# Patient Record
Sex: FEMALE | Race: WHITE | Employment: OTHER | URBAN - METROPOLITAN AREA
[De-identification: names, ages, dates, MRNs, and addresses within clinical notes are randomized per-mention and may not be internally consistent; named-entity substitution may affect disease eponyms.]

---

## 2017-10-12 ENCOUNTER — HOSPITAL ENCOUNTER (OUTPATIENT)
Dept: MAMMOGRAPHY | Age: 72
Discharge: HOME | End: 2017-10-12
Admitting: FAMILY MEDICINE

## 2017-10-12 DIAGNOSIS — Z12.31 ENCOUNTER FOR SCREENING MAMMOGRAM FOR BREAST CANCER: ICD-10-CM

## 2018-09-04 ENCOUNTER — OFFICE VISIT (OUTPATIENT)
Dept: CARDIOLOGY CLINIC | Age: 73
End: 2018-09-04

## 2018-09-04 VITALS
HEART RATE: 74 BPM | WEIGHT: 142 LBS | SYSTOLIC BLOOD PRESSURE: 116 MMHG | DIASTOLIC BLOOD PRESSURE: 64 MMHG | BODY MASS INDEX: 26.13 KG/M2 | OXYGEN SATURATION: 96 % | HEIGHT: 62 IN

## 2018-09-04 DIAGNOSIS — E78.2 MIXED HYPERLIPIDEMIA: ICD-10-CM

## 2018-09-04 DIAGNOSIS — R06.09 DOE (DYSPNEA ON EXERTION): ICD-10-CM

## 2018-09-04 DIAGNOSIS — R07.89 OTHER CHEST PAIN: ICD-10-CM

## 2018-09-04 DIAGNOSIS — R07.9 CHEST PAIN, UNSPECIFIED TYPE: Primary | ICD-10-CM

## 2018-09-04 PROCEDURE — 4040F PNEUMOC VAC/ADMIN/RCVD: CPT | Performed by: INTERNAL MEDICINE

## 2018-09-04 PROCEDURE — 1123F ACP DISCUSS/DSCN MKR DOCD: CPT | Performed by: INTERNAL MEDICINE

## 2018-09-04 PROCEDURE — 1101F PT FALLS ASSESS-DOCD LE1/YR: CPT | Performed by: INTERNAL MEDICINE

## 2018-09-04 PROCEDURE — G8427 DOCREV CUR MEDS BY ELIG CLIN: HCPCS | Performed by: INTERNAL MEDICINE

## 2018-09-04 PROCEDURE — 1090F PRES/ABSN URINE INCON ASSESS: CPT | Performed by: INTERNAL MEDICINE

## 2018-09-04 PROCEDURE — 93000 ELECTROCARDIOGRAM COMPLETE: CPT | Performed by: INTERNAL MEDICINE

## 2018-09-04 PROCEDURE — 1036F TOBACCO NON-USER: CPT | Performed by: INTERNAL MEDICINE

## 2018-09-04 PROCEDURE — G8419 CALC BMI OUT NRM PARAM NOF/U: HCPCS | Performed by: INTERNAL MEDICINE

## 2018-09-04 PROCEDURE — 99215 OFFICE O/P EST HI 40 MIN: CPT | Performed by: INTERNAL MEDICINE

## 2018-09-04 PROCEDURE — 3017F COLORECTAL CA SCREEN DOC REV: CPT | Performed by: INTERNAL MEDICINE

## 2018-09-04 PROCEDURE — G8400 PT W/DXA NO RESULTS DOC: HCPCS | Performed by: INTERNAL MEDICINE

## 2018-09-04 RX ORDER — AMOXICILLIN 500 MG
CAPSULE ORAL
COMMUNITY

## 2018-09-04 RX ORDER — CALCIUM CARBONATE 300MG(750)
TABLET,CHEWABLE ORAL
COMMUNITY

## 2018-09-04 NOTE — PATIENT INSTRUCTIONS
Plan:  1. Echocardiogram  2. GXT myoview  3. Recommend follow up with PCP  4. Labs- Lipids, CMP  5.  Follow up dependent upon testing - We will call you with results

## 2018-09-04 NOTE — COMMUNICATION BODY
never smoked. She has never used smokeless tobacco. She reports that she does not drink alcohol or use illicit drugs. Family History:  Father  age 67   Mother had \"heart problems\" but could not be specific  Brother  age 61 from massive stroke      Home Medications:  Prior to Admission medications    Medication Sig Start Date End Date Taking? Authorizing Provider   Magnesium 400 MG TABS Take by mouth   Yes Historical Provider, MD   Omega-3 Fatty Acids (FISH OIL) 1200 MG CAPS Take by mouth   Yes Historical Provider, MD   citalopram (CELEXA) 20 MG tablet Take 20 mg by mouth daily. Yes Historical Provider, MD   Multiple Vitamins-Minerals (THERAPEUTIC MULTIVITAMIN-MINERALS) tablet Take 1 tablet by mouth daily. Yes Historical Provider, MD   Calcium Carbonate-Vitamin D (CALCIUM-VITAMIN D) 500-200 MG-UNIT per tablet Take 1 tablet by mouth daily. Yes Historical Provider, MD   Glucosamine-Chondroitin (GLUCOSAMINE CHONDR COMPLEX PO) Take  by mouth. Yes Historical Provider, MD   fexofenadine (ALLEGRA) 180 MG tablet Take 180 mg by mouth daily. Historical Provider, MD   Coenzyme Q10 (CO Q 10) 100 MG CAPS Take 200 mg by mouth daily. Historical Provider, MD        Allergies:  Sulfa antibiotics     Review of Systems:   · Constitutional: there has been no unanticipated weight loss. There's been no change in energy level, sleep pattern, or activity level. · Eyes: No visual changes or diplopia. No scleral icterus. · ENT: No Headaches, hearing loss or vertigo. No mouth sores or sore throat. · Cardiovascular: Reviewed in HPI  · Respiratory: No cough or wheezing, no sputum production. No hematemesis. · Gastrointestinal: No abdominal pain, appetite loss, blood in stools. No change in bowel or bladder habits. · Genitourinary: No dysuria, trouble voiding, or hematuria. · Musculoskeletal:  No gait disturbance, weakness or joint complaints. · Integumentary: No rash or pruritis.   · Neurological: No headache, diplopia, change in muscle strength, numbness or tingling. No change in gait, balance, coordination, mood, affect, memory, mentation, behavior. · Psychiatric: No anxiety, no depression. · Endocrine: No malaise, fatigue or temperature intolerance. No excessive thirst, fluid intake, or urination. No tremor. · Hematologic/Lymphatic: No abnormal bruising or bleeding, blood clots or swollen lymph nodes. · Allergic/Immunologic: No nasal congestion or hives. Physical Examination:    Vitals:    09/04/18 1232   BP: 116/64   Pulse: 74   SpO2: 96%        Constitutional and General Appearance: NAD   Respiratory:  · Normal excursion and expansion without use of accessory muscles  · Resp Auscultation: Normal breath sounds without dullness  Cardiovascular:  · The apical impulses not displaced  · Heart tones are crisp and normal  · Cervical veins are not engorged  · The carotid upstroke is normal in amplitude and contour without delay or bruit  · Normal S1S2, No S3, 2/6 JONO   · Peripheral pulses are symmetrical and full  · There is no clubbing, cyanosis of the extremities. · No edema  · Femoral Arteries: 2+ and equal  · Pedal Pulses: 2+ and equal   Abdomen:  · No masses or tenderness  · Liver/Spleen: No Abnormalities Noted  Neurological/Psychiatric:  · Alert and oriented in all spheres  · Moves all extremities well  · Exhibits normal gait balance and coordination  · No abnormalities of mood, affect, memory, mentation, or behavior are noted      Assessment:     1. Abnormal EKG: No arrhythmia. Possible prior septal infarct but was present on 2014 study with no change. 2. Hyperlipidemia: Took herself off lipitor years ago due to myalgias. Need to recheck labs and restart another statin if necessary. 3.  SOB and CP: She has CAD risk factors including age and untreated HLD. Also need concern for development of cardiomyopathy and CHF symptoms. Concern for ischemia vs CHF vs combination or non-cardiac cause.

## 2018-09-04 NOTE — PROGRESS NOTES
headache, diplopia, change in muscle strength, numbness or tingling. No change in gait, balance, coordination, mood, affect, memory, mentation, behavior. · Psychiatric: No anxiety, no depression. · Endocrine: No malaise, fatigue or temperature intolerance. No excessive thirst, fluid intake, or urination. No tremor. · Hematologic/Lymphatic: No abnormal bruising or bleeding, blood clots or swollen lymph nodes. · Allergic/Immunologic: No nasal congestion or hives. Physical Examination:    Vitals:    09/04/18 1232   BP: 116/64   Pulse: 74   SpO2: 96%        Constitutional and General Appearance: NAD   Respiratory:  · Normal excursion and expansion without use of accessory muscles  · Resp Auscultation: Normal breath sounds without dullness  Cardiovascular:  · The apical impulses not displaced  · Heart tones are crisp and normal  · Cervical veins are not engorged  · The carotid upstroke is normal in amplitude and contour without delay or bruit  · Normal S1S2, No S3, 2/6 JONO   · Peripheral pulses are symmetrical and full  · There is no clubbing, cyanosis of the extremities. · No edema  · Femoral Arteries: 2+ and equal  · Pedal Pulses: 2+ and equal   Abdomen:  · No masses or tenderness  · Liver/Spleen: No Abnormalities Noted  Neurological/Psychiatric:  · Alert and oriented in all spheres  · Moves all extremities well  · Exhibits normal gait balance and coordination  · No abnormalities of mood, affect, memory, mentation, or behavior are noted      Assessment:     1. Abnormal EKG: No arrhythmia. Possible prior septal infarct but was present on 2014 study with no change. 2. Hyperlipidemia: Took herself off lipitor years ago due to myalgias. Need to recheck labs and restart another statin if necessary. 3.  SOB and CP: She has CAD risk factors including age and untreated HLD. Also need concern for development of cardiomyopathy and CHF symptoms. Concern for ischemia vs CHF vs combination or non-cardiac cause.

## 2018-09-11 ENCOUNTER — HOSPITAL ENCOUNTER (OUTPATIENT)
Age: 73
Discharge: HOME | End: 2018-09-11
Payer: MEDICARE

## 2018-09-11 DIAGNOSIS — R06.09 DOE (DYSPNEA ON EXERTION): ICD-10-CM

## 2018-09-11 DIAGNOSIS — R07.89 OTHER CHEST PAIN: Primary | ICD-10-CM

## 2018-09-11 DIAGNOSIS — E78.2 MIXED HYPERLIPIDEMIA: ICD-10-CM

## 2018-09-11 LAB
A/G RATIO: 1.3 (ref 1.1–2.2)
ALBUMIN SERPL-MCNC: 4.4 G/DL (ref 3.4–5)
ALP BLD-CCNC: 82 U/L (ref 40–129)
ALT SERPL-CCNC: 16 U/L (ref 10–40)
ANION GAP SERPL CALCULATED.3IONS-SCNC: 9 MMOL/L (ref 3–16)
AST SERPL-CCNC: 20 U/L (ref 15–37)
BILIRUB SERPL-MCNC: 0.3 MG/DL (ref 0–1)
BUN BLDV-MCNC: 11 MG/DL (ref 7–20)
CALCIUM SERPL-MCNC: 9.5 MG/DL (ref 8.3–10.6)
CHLORIDE BLD-SCNC: 99 MMOL/L (ref 99–110)
CHOLESTEROL, TOTAL: 216 MG/DL (ref 0–199)
CO2: 28 MMOL/L (ref 21–32)
CREAT SERPL-MCNC: <0.5 MG/DL (ref 0.6–1.2)
GFR AFRICAN AMERICAN: >60
GFR NON-AFRICAN AMERICAN: >60
GLOBULIN: 3.4 G/DL
GLUCOSE BLD-MCNC: 110 MG/DL (ref 70–99)
HDLC SERPL-MCNC: 48 MG/DL (ref 40–60)
LDL CHOLESTEROL CALCULATED: 137 MG/DL
POTASSIUM SERPL-SCNC: 4.3 MMOL/L (ref 3.5–5.1)
SODIUM BLD-SCNC: 136 MMOL/L (ref 136–145)
TOTAL PROTEIN: 7.8 G/DL (ref 6.4–8.2)
TRIGL SERPL-MCNC: 153 MG/DL (ref 0–150)
VLDLC SERPL CALC-MCNC: 31 MG/DL

## 2018-09-11 PROCEDURE — 36415 COLL VENOUS BLD VENIPUNCTURE: CPT

## 2018-09-11 PROCEDURE — 80061 LIPID PANEL: CPT

## 2018-09-11 PROCEDURE — 80053 COMPREHEN METABOLIC PANEL: CPT

## 2018-09-12 ENCOUNTER — TELEPHONE (OUTPATIENT)
Dept: CARDIOLOGY CLINIC | Age: 73
End: 2018-09-12

## 2018-09-17 ENCOUNTER — HOSPITAL ENCOUNTER (OUTPATIENT)
Dept: NON INVASIVE DIAGNOSTICS | Age: 73
Discharge: HOME | End: 2018-09-17
Payer: MEDICARE

## 2018-09-17 ENCOUNTER — HOSPITAL ENCOUNTER (OUTPATIENT)
Dept: NUCLEAR MEDICINE | Age: 73
Discharge: HOME | End: 2018-09-17
Payer: MEDICARE

## 2018-09-17 DIAGNOSIS — R06.09 DOE (DYSPNEA ON EXERTION): ICD-10-CM

## 2018-09-17 DIAGNOSIS — R07.9 CHEST PAIN, UNSPECIFIED TYPE: ICD-10-CM

## 2018-09-17 LAB
LV EF: 67 %
LVEF MODALITY: NORMAL

## 2018-09-17 PROCEDURE — 93017 CV STRESS TEST TRACING ONLY: CPT

## 2018-09-17 PROCEDURE — A9502 TC99M TETROFOSMIN: HCPCS | Performed by: INTERNAL MEDICINE

## 2018-09-17 PROCEDURE — 78452 HT MUSCLE IMAGE SPECT MULT: CPT

## 2018-09-17 PROCEDURE — 3430000000 HC RX DIAGNOSTIC RADIOPHARMACEUTICAL: Performed by: INTERNAL MEDICINE

## 2018-09-17 RX ADMIN — TETROFOSMIN 11 MILLICURIE: 0.23 INJECTION, POWDER, LYOPHILIZED, FOR SOLUTION INTRAVENOUS at 08:45

## 2018-09-17 ASSESSMENT — PAIN - FUNCTIONAL ASSESSMENT: PAIN_FUNCTIONAL_ASSESSMENT: 0-10

## 2018-09-17 NOTE — PROGRESS NOTES
Pt completed stress portion of cardiac stress test. Pt is discharged to nuclear department for final scan. Nuclear tech will remove PIV. Discharge instructions given to pt. Pt verbalizes understanding to discharge instructions.

## 2018-09-18 ENCOUNTER — TELEPHONE (OUTPATIENT)
Dept: CARDIOLOGY CLINIC | Age: 73
End: 2018-09-18

## 2018-09-18 NOTE — TELEPHONE ENCOUNTER
Stress/Rest NM Myocardial SPECT RX   Order: [id number]   Status:  Final result   Visible to patient:  No (Not Released)   Notes recorded by Cristine Webster RN on 9/17/2018 at 3:51 PM EDT  Attempted calling patient no answer unable to leave message mailbox full will need to try Cathy Huang RN      ------    Notes recorded by Elif Chapin MD on 9/17/2018 at 2:46 PM EDT  Call  Stress test looks ok  No evidence sig blockage     Notified of results

## 2018-12-10 ENCOUNTER — HOSPITAL ENCOUNTER (OUTPATIENT)
Dept: MAMMOGRAPHY | Age: 73
Discharge: HOME | End: 2018-12-10
Payer: MEDICARE

## 2018-12-10 DIAGNOSIS — Z12.31 SCREENING MAMMOGRAM, ENCOUNTER FOR: ICD-10-CM

## 2018-12-10 PROCEDURE — 77063 BREAST TOMOSYNTHESIS BI: CPT

## 2020-02-19 ENCOUNTER — HOSPITAL ENCOUNTER (OUTPATIENT)
Dept: MAMMOGRAPHY | Age: 75
Discharge: HOME | End: 2020-02-19
Payer: MEDICARE

## 2020-02-19 PROCEDURE — 77063 BREAST TOMOSYNTHESIS BI: CPT

## 2021-02-08 ENCOUNTER — HOSPITAL ENCOUNTER (OUTPATIENT)
Dept: MAMMOGRAPHY | Age: 76
Discharge: HOME | End: 2021-02-08
Payer: MEDICARE

## 2021-02-08 DIAGNOSIS — Z12.31 BREAST CANCER SCREENING BY MAMMOGRAM: ICD-10-CM

## 2021-02-08 PROCEDURE — 77063 BREAST TOMOSYNTHESIS BI: CPT

## 2021-10-29 ENCOUNTER — HOSPITAL ENCOUNTER (OUTPATIENT)
Dept: GENERAL RADIOLOGY | Age: 76
Discharge: HOME | End: 2021-10-29
Payer: MEDICARE

## 2021-10-29 DIAGNOSIS — Z78.0 POST-MENOPAUSAL: ICD-10-CM

## 2021-10-29 PROCEDURE — 77080 DXA BONE DENSITY AXIAL: CPT

## 2022-04-24 ENCOUNTER — HOSPITAL ENCOUNTER (EMERGENCY)
Age: 77
Discharge: ANOTHER HOSPITAL | End: 2022-04-25
Attending: EMERGENCY MEDICINE
Payer: MEDICARE

## 2022-04-24 ENCOUNTER — APPOINTMENT (OUTPATIENT)
Dept: GENERAL RADIOLOGY | Age: 77
End: 2022-04-24
Payer: MEDICARE

## 2022-04-24 DIAGNOSIS — R07.9 CHEST PAIN, UNSPECIFIED TYPE: Primary | ICD-10-CM

## 2022-04-24 LAB
ANION GAP SERPL CALCULATED.3IONS-SCNC: 11 MMOL/L (ref 3–16)
BASOPHILS ABSOLUTE: 0 K/UL (ref 0–0.2)
BASOPHILS RELATIVE PERCENT: 0.3 %
BUN BLDV-MCNC: 17 MG/DL (ref 7–20)
CALCIUM SERPL-MCNC: 10.1 MG/DL (ref 8.3–10.6)
CHLORIDE BLD-SCNC: 98 MMOL/L (ref 99–110)
CO2: 25 MMOL/L (ref 21–32)
CREAT SERPL-MCNC: 0.6 MG/DL (ref 0.6–1.2)
EOSINOPHILS ABSOLUTE: 0.1 K/UL (ref 0–0.6)
EOSINOPHILS RELATIVE PERCENT: 1.2 %
GFR AFRICAN AMERICAN: >60
GFR NON-AFRICAN AMERICAN: >60
GLUCOSE BLD-MCNC: 113 MG/DL (ref 70–99)
HCT VFR BLD CALC: 39.2 % (ref 36–48)
HEMOGLOBIN: 13.1 G/DL (ref 12–16)
LYMPHOCYTES ABSOLUTE: 1.4 K/UL (ref 1–5.1)
LYMPHOCYTES RELATIVE PERCENT: 11.3 %
MCH RBC QN AUTO: 28.3 PG (ref 26–34)
MCHC RBC AUTO-ENTMCNC: 33.3 G/DL (ref 31–36)
MCV RBC AUTO: 84.9 FL (ref 80–100)
MONOCYTES ABSOLUTE: 0.4 K/UL (ref 0–1.3)
MONOCYTES RELATIVE PERCENT: 3 %
NEUTROPHILS ABSOLUTE: 10.2 K/UL (ref 1.7–7.7)
NEUTROPHILS RELATIVE PERCENT: 84.2 %
PDW BLD-RTO: 13.5 % (ref 12.4–15.4)
PLATELET # BLD: 384 K/UL (ref 135–450)
PMV BLD AUTO: 7.5 FL (ref 5–10.5)
POTASSIUM SERPL-SCNC: 5.4 MMOL/L (ref 3.5–5.1)
RAPID INFLUENZA  B AGN: NEGATIVE
RAPID INFLUENZA A AGN: NEGATIVE
RBC # BLD: 4.61 M/UL (ref 4–5.2)
SARS-COV-2, NAAT: NOT DETECTED
SODIUM BLD-SCNC: 134 MMOL/L (ref 136–145)
TROPONIN: <0.01 NG/ML
WBC # BLD: 12.1 K/UL (ref 4–11)

## 2022-04-24 PROCEDURE — 71045 X-RAY EXAM CHEST 1 VIEW: CPT

## 2022-04-24 PROCEDURE — 96375 TX/PRO/DX INJ NEW DRUG ADDON: CPT

## 2022-04-24 PROCEDURE — 84484 ASSAY OF TROPONIN QUANT: CPT

## 2022-04-24 PROCEDURE — 85025 COMPLETE CBC W/AUTO DIFF WBC: CPT

## 2022-04-24 PROCEDURE — 87635 SARS-COV-2 COVID-19 AMP PRB: CPT

## 2022-04-24 PROCEDURE — 36415 COLL VENOUS BLD VENIPUNCTURE: CPT

## 2022-04-24 PROCEDURE — 99285 EMERGENCY DEPT VISIT HI MDM: CPT

## 2022-04-24 PROCEDURE — 80048 BASIC METABOLIC PNL TOTAL CA: CPT

## 2022-04-24 PROCEDURE — 87804 INFLUENZA ASSAY W/OPTIC: CPT

## 2022-04-24 PROCEDURE — 6370000000 HC RX 637 (ALT 250 FOR IP): Performed by: EMERGENCY MEDICINE

## 2022-04-24 PROCEDURE — 96374 THER/PROPH/DIAG INJ IV PUSH: CPT

## 2022-04-24 PROCEDURE — 93005 ELECTROCARDIOGRAM TRACING: CPT | Performed by: EMERGENCY MEDICINE

## 2022-04-24 PROCEDURE — 6360000002 HC RX W HCPCS: Performed by: EMERGENCY MEDICINE

## 2022-04-24 RX ORDER — LISINOPRIL 10 MG/1
TABLET ORAL
COMMUNITY
Start: 2022-03-24

## 2022-04-24 RX ORDER — ONDANSETRON 4 MG/1
4 TABLET, ORALLY DISINTEGRATING ORAL ONCE
Status: DISCONTINUED | OUTPATIENT
Start: 2022-04-24 | End: 2022-04-24

## 2022-04-24 RX ORDER — ACETAMINOPHEN 325 MG/1
650 TABLET ORAL ONCE
Status: COMPLETED | OUTPATIENT
Start: 2022-04-24 | End: 2022-04-24

## 2022-04-24 RX ORDER — PROCHLORPERAZINE EDISYLATE 5 MG/ML
10 INJECTION INTRAMUSCULAR; INTRAVENOUS ONCE
Status: COMPLETED | OUTPATIENT
Start: 2022-04-24 | End: 2022-04-24

## 2022-04-24 RX ORDER — ASPIRIN 81 MG/1
162 TABLET, CHEWABLE ORAL ONCE
Status: COMPLETED | OUTPATIENT
Start: 2022-04-24 | End: 2022-04-24

## 2022-04-24 RX ORDER — ONDANSETRON 2 MG/ML
4 INJECTION INTRAMUSCULAR; INTRAVENOUS ONCE
Status: COMPLETED | OUTPATIENT
Start: 2022-04-24 | End: 2022-04-24

## 2022-04-24 RX ORDER — ATORVASTATIN CALCIUM 20 MG/1
TABLET, FILM COATED ORAL
COMMUNITY
Start: 2019-11-13

## 2022-04-24 RX ADMIN — ACETAMINOPHEN 650 MG: 325 TABLET ORAL at 22:18

## 2022-04-24 RX ADMIN — ONDANSETRON HYDROCHLORIDE 4 MG: 2 INJECTION, SOLUTION INTRAMUSCULAR; INTRAVENOUS at 21:53

## 2022-04-24 RX ADMIN — ASPIRIN 81 MG 162 MG: 81 TABLET ORAL at 22:18

## 2022-04-24 RX ADMIN — LIDOCAINE HYDROCHLORIDE: 20 SOLUTION ORAL; TOPICAL at 22:18

## 2022-04-24 RX ADMIN — PROCHLORPERAZINE EDISYLATE 10 MG: 5 INJECTION INTRAMUSCULAR; INTRAVENOUS at 22:44

## 2022-04-24 ASSESSMENT — PAIN DESCRIPTION - PAIN TYPE: TYPE: ACUTE PAIN

## 2022-04-24 ASSESSMENT — PAIN DESCRIPTION - LOCATION: LOCATION: BACK;CHEST;NECK

## 2022-04-24 ASSESSMENT — ENCOUNTER SYMPTOMS
NAUSEA: 1
ABDOMINAL PAIN: 0
TROUBLE SWALLOWING: 0
VOICE CHANGE: 0
VOMITING: 0
SHORTNESS OF BREATH: 0
BACK PAIN: 1
WHEEZING: 0
FACIAL SWELLING: 0
COLOR CHANGE: 0
STRIDOR: 0

## 2022-04-24 ASSESSMENT — PAIN - FUNCTIONAL ASSESSMENT: PAIN_FUNCTIONAL_ASSESSMENT: 0-10

## 2022-04-24 ASSESSMENT — PAIN DESCRIPTION - DESCRIPTORS: DESCRIPTORS: SHARP

## 2022-04-24 ASSESSMENT — PAIN SCALES - GENERAL: PAINLEVEL_OUTOF10: 7

## 2022-04-25 ENCOUNTER — APPOINTMENT (OUTPATIENT)
Dept: GENERAL RADIOLOGY | Age: 77
DRG: 314 | End: 2022-04-25
Attending: INTERNAL MEDICINE
Payer: MEDICARE

## 2022-04-25 ENCOUNTER — HOSPITAL ENCOUNTER (INPATIENT)
Age: 77
LOS: 1 days | Discharge: HOME | DRG: 314 | End: 2022-04-27
Attending: INTERNAL MEDICINE | Admitting: INTERNAL MEDICINE
Payer: MEDICARE

## 2022-04-25 ENCOUNTER — APPOINTMENT (OUTPATIENT)
Dept: CT IMAGING | Age: 77
DRG: 314 | End: 2022-04-25
Attending: INTERNAL MEDICINE
Payer: MEDICARE

## 2022-04-25 VITALS
SYSTOLIC BLOOD PRESSURE: 99 MMHG | BODY MASS INDEX: 27.05 KG/M2 | DIASTOLIC BLOOD PRESSURE: 50 MMHG | HEART RATE: 79 BPM | HEIGHT: 62 IN | OXYGEN SATURATION: 90 % | WEIGHT: 147 LBS | TEMPERATURE: 98.5 F | RESPIRATION RATE: 20 BRPM

## 2022-04-25 PROBLEM — R07.9 CHEST PAIN: Status: ACTIVE | Noted: 2022-04-25

## 2022-04-25 LAB
ALBUMIN SERPL-MCNC: 4.4 G/DL (ref 3.4–5)
ALP BLD-CCNC: 76 U/L (ref 40–129)
ALT SERPL-CCNC: 18 U/L (ref 10–40)
AST SERPL-CCNC: 23 U/L (ref 15–37)
BILIRUB SERPL-MCNC: 0.4 MG/DL (ref 0–1)
BILIRUBIN DIRECT: <0.2 MG/DL (ref 0–0.3)
BILIRUBIN, INDIRECT: NORMAL MG/DL (ref 0–1)
D DIMER: 600 NG/ML DDU (ref 0–229)
EKG ATRIAL RATE: 92 BPM
EKG DIAGNOSIS: NORMAL
EKG P AXIS: 47 DEGREES
EKG P-R INTERVAL: 146 MS
EKG Q-T INTERVAL: 356 MS
EKG QRS DURATION: 74 MS
EKG QTC CALCULATION (BAZETT): 440 MS
EKG R AXIS: 16 DEGREES
EKG T AXIS: 59 DEGREES
EKG VENTRICULAR RATE: 92 BPM
LIPASE: 42 U/L (ref 13–60)
TOTAL PROTEIN: 7.4 G/DL (ref 6.4–8.2)
TROPONIN: <0.01 NG/ML
TROPONIN: <0.01 NG/ML

## 2022-04-25 PROCEDURE — 6360000004 HC RX CONTRAST MEDICATION: Performed by: HOSPITALIST

## 2022-04-25 PROCEDURE — 83690 ASSAY OF LIPASE: CPT

## 2022-04-25 PROCEDURE — 36415 COLL VENOUS BLD VENIPUNCTURE: CPT

## 2022-04-25 PROCEDURE — 2580000003 HC RX 258: Performed by: HOSPITALIST

## 2022-04-25 PROCEDURE — 71046 X-RAY EXAM CHEST 2 VIEWS: CPT

## 2022-04-25 PROCEDURE — 80076 HEPATIC FUNCTION PANEL: CPT

## 2022-04-25 PROCEDURE — 96365 THER/PROPH/DIAG IV INF INIT: CPT

## 2022-04-25 PROCEDURE — 96366 THER/PROPH/DIAG IV INF ADDON: CPT

## 2022-04-25 PROCEDURE — G0378 HOSPITAL OBSERVATION PER HR: HCPCS

## 2022-04-25 PROCEDURE — 93010 ELECTROCARDIOGRAM REPORT: CPT | Performed by: INTERNAL MEDICINE

## 2022-04-25 PROCEDURE — 84484 ASSAY OF TROPONIN QUANT: CPT

## 2022-04-25 PROCEDURE — 6360000002 HC RX W HCPCS: Performed by: HOSPITALIST

## 2022-04-25 PROCEDURE — 85379 FIBRIN DEGRADATION QUANT: CPT

## 2022-04-25 PROCEDURE — 96372 THER/PROPH/DIAG INJ SC/IM: CPT

## 2022-04-25 PROCEDURE — 71260 CT THORAX DX C+: CPT

## 2022-04-25 PROCEDURE — G0379 DIRECT REFER HOSPITAL OBSERV: HCPCS

## 2022-04-25 PROCEDURE — 6370000000 HC RX 637 (ALT 250 FOR IP): Performed by: HOSPITALIST

## 2022-04-25 RX ORDER — ATORVASTATIN CALCIUM 40 MG/1
40 TABLET, FILM COATED ORAL NIGHTLY
Status: DISCONTINUED | OUTPATIENT
Start: 2022-04-25 | End: 2022-04-27 | Stop reason: HOSPADM

## 2022-04-25 RX ORDER — SODIUM CHLORIDE 0.9 % (FLUSH) 0.9 %
5-40 SYRINGE (ML) INJECTION EVERY 12 HOURS SCHEDULED
Status: DISCONTINUED | OUTPATIENT
Start: 2022-04-25 | End: 2022-04-27 | Stop reason: HOSPADM

## 2022-04-25 RX ORDER — ONDANSETRON 4 MG/1
4 TABLET, ORALLY DISINTEGRATING ORAL EVERY 8 HOURS PRN
Status: DISCONTINUED | OUTPATIENT
Start: 2022-04-25 | End: 2022-04-27 | Stop reason: HOSPADM

## 2022-04-25 RX ORDER — ASPIRIN 81 MG/1
81 TABLET, CHEWABLE ORAL DAILY
Status: DISCONTINUED | OUTPATIENT
Start: 2022-04-26 | End: 2022-04-27 | Stop reason: HOSPADM

## 2022-04-25 RX ORDER — LEVOFLOXACIN 5 MG/ML
750 INJECTION, SOLUTION INTRAVENOUS EVERY 24 HOURS
Status: DISCONTINUED | OUTPATIENT
Start: 2022-04-25 | End: 2022-04-27 | Stop reason: HOSPADM

## 2022-04-25 RX ORDER — ACETAMINOPHEN 325 MG/1
650 TABLET ORAL EVERY 6 HOURS PRN
Status: DISCONTINUED | OUTPATIENT
Start: 2022-04-25 | End: 2022-04-27 | Stop reason: HOSPADM

## 2022-04-25 RX ORDER — ONDANSETRON 2 MG/ML
4 INJECTION INTRAMUSCULAR; INTRAVENOUS EVERY 6 HOURS PRN
Status: DISCONTINUED | OUTPATIENT
Start: 2022-04-25 | End: 2022-04-27 | Stop reason: HOSPADM

## 2022-04-25 RX ORDER — SODIUM CHLORIDE 9 MG/ML
INJECTION, SOLUTION INTRAVENOUS PRN
Status: DISCONTINUED | OUTPATIENT
Start: 2022-04-25 | End: 2022-04-27 | Stop reason: HOSPADM

## 2022-04-25 RX ORDER — POLYETHYLENE GLYCOL 3350 17 G/17G
17 POWDER, FOR SOLUTION ORAL DAILY PRN
Status: DISCONTINUED | OUTPATIENT
Start: 2022-04-25 | End: 2022-04-27 | Stop reason: HOSPADM

## 2022-04-25 RX ORDER — ACETAMINOPHEN 650 MG/1
650 SUPPOSITORY RECTAL EVERY 6 HOURS PRN
Status: DISCONTINUED | OUTPATIENT
Start: 2022-04-25 | End: 2022-04-27 | Stop reason: HOSPADM

## 2022-04-25 RX ORDER — CITALOPRAM 20 MG/1
20 TABLET ORAL NIGHTLY
Status: DISCONTINUED | OUTPATIENT
Start: 2022-04-26 | End: 2022-04-27 | Stop reason: HOSPADM

## 2022-04-25 RX ORDER — ENOXAPARIN SODIUM 100 MG/ML
40 INJECTION SUBCUTANEOUS EVERY 24 HOURS
Status: DISCONTINUED | OUTPATIENT
Start: 2022-04-25 | End: 2022-04-27 | Stop reason: HOSPADM

## 2022-04-25 RX ORDER — SODIUM CHLORIDE 0.9 % (FLUSH) 0.9 %
5-40 SYRINGE (ML) INJECTION PRN
Status: DISCONTINUED | OUTPATIENT
Start: 2022-04-25 | End: 2022-04-27 | Stop reason: HOSPADM

## 2022-04-25 RX ORDER — SODIUM CHLORIDE 9 MG/ML
INJECTION, SOLUTION INTRAVENOUS CONTINUOUS
Status: DISCONTINUED | OUTPATIENT
Start: 2022-04-25 | End: 2022-04-26

## 2022-04-25 RX ORDER — CITALOPRAM 20 MG/1
20 TABLET ORAL DAILY
Status: DISCONTINUED | OUTPATIENT
Start: 2022-04-25 | End: 2022-04-25

## 2022-04-25 RX ADMIN — ENOXAPARIN SODIUM 40 MG: 100 INJECTION SUBCUTANEOUS at 12:37

## 2022-04-25 RX ADMIN — ATORVASTATIN CALCIUM 40 MG: 40 TABLET, FILM COATED ORAL at 20:23

## 2022-04-25 RX ADMIN — ACETAMINOPHEN 650 MG: 325 TABLET ORAL at 20:23

## 2022-04-25 RX ADMIN — SODIUM CHLORIDE: 9 INJECTION, SOLUTION INTRAVENOUS at 12:31

## 2022-04-25 RX ADMIN — SODIUM CHLORIDE, PRESERVATIVE FREE 10 ML: 5 INJECTION INTRAVENOUS at 12:37

## 2022-04-25 RX ADMIN — IOPAMIDOL 75 ML: 755 INJECTION, SOLUTION INTRAVENOUS at 14:10

## 2022-04-25 RX ADMIN — LEVOFLOXACIN 750 MG: 5 INJECTION, SOLUTION INTRAVENOUS at 19:04

## 2022-04-25 ASSESSMENT — PAIN SCALES - GENERAL
PAINLEVEL_OUTOF10: 0
PAINLEVEL_OUTOF10: 7

## 2022-04-25 ASSESSMENT — PAIN DESCRIPTION - LOCATION: LOCATION: HEAD

## 2022-04-25 NOTE — ED PROVIDER NOTES
[facility]  eMERGENCY dEPARTMENT eNCOUnter      Pt Name: Di Nava  MRN: [id number]  [place] 1945  Date of evaluation: 4/24/2022  Provider: Nancy Rodriguez MD    [address] [place] [address]       Chief Complaint   Patient presents with    Chest Pain     with back pain since 2pm       HISTORY OF PRESENT ILLNESS   (Location/Symptom, Timing/Onset, Context/Setting, Quality, Duration, Modifying Factors, Severity)  Note limiting factors. Di Nava is a 78 y.o. female with hx of GERD who reports left-sided chest pain radiating to her back, left jaw, and down her left arm starting at 2 PM today. Patient denies any exertion or inciting event. She denies any known aggravating factor and denies any eating around the time of the pain. Patient reports that her symptoms were moderate, constant, however did improve after approximately 5 hours after she took half of a 325 mg aspirin. Patient denies any leg swelling hemoptysis or shortness of breath. She reports her symptoms are moderate constant and gradually improving. HPI    Nursing Notes were reviewed. REVIEW OFSYSTEMS    (2-9 systems for level 4, 10 or more for level 5)     Review of Systems   Constitutional: Positive for diaphoresis. Negative for appetite change, fever and unexpected weight change. HENT: Negative for facial swelling, trouble swallowing and voice change. Eyes: Negative for visual disturbance. Respiratory: Negative for shortness of breath, wheezing and stridor. Cardiovascular: Positive for chest pain. Negative for palpitations. Gastrointestinal: Positive for nausea. Negative for abdominal pain and vomiting. Genitourinary: Negative for dysuria and vaginal bleeding. Musculoskeletal: Positive for arthralgias, back pain and myalgias. Negative for neck pain and neck stiffness. Skin: Negative for color change and wound. Neurological: Negative for seizures and syncope.    Psychiatric/Behavioral: Negative for Transportation (Medical): Not on file    Lack of Transportation (Non-Medical): Not on file   Physical Activity:     Days of Exercise per Week: Not on file    Minutes of Exercise per Session: Not on file   Stress:     Feeling of Stress : Not on file   Social Connections:     Frequency of Communication with Friends and Family: Not on file    Frequency of Social Gatherings with Friends and Family: Not on file    Attends Hoahaoism Services: Not on file    Active Member of [address] [place] [address] or Organizations: Not on file    Attends Club or Organization Meetings: Not on file    Marital Status: Not on file   Intimate Partner Violence:     Fear of Current or Ex-Partner: Not on file    Emotionally Abused: Not on file    Physically Abused: Not on file    Sexually Abused: Not on file   Housing Stability:     Unable to Pay for Housing in the Last Year: Not on file    Number of [place] in the Last Year: Not on file    Unstable Housing in the Last Year: Not on file         PHYSICAL EXAM    (up to 7 for level 4, 8 or more for level 5)     ED Triage Vitals [04/24/22 2051]   BP Temp Temp Source Pulse Resp SpO2 Height Weight   (!) 153/74 98.3 °F (36.8 °C) Oral 98 16 98 % 5' 2\" (1.575 m) 147 lb (66.7 kg)       Physical Exam  Vitals and nursing note reviewed. Constitutional:       Appearance: She is well-developed. She is not diaphoretic. HENT:      Head: Normocephalic and atraumatic. Right Ear: External ear normal.      Left Ear: External ear normal.   Eyes:      Conjunctiva/sclera: Conjunctivae normal.   Neck:      Vascular: No JVD. Trachea: No tracheal deviation. Cardiovascular:      Rate and Rhythm: Normal rate. Pulmonary:      Effort: Pulmonary effort is normal. No respiratory distress. Breath sounds: Normal breath sounds. No wheezing. Abdominal:      General: There is no distension. Palpations: Abdomen is soft. Tenderness: There is no abdominal tenderness.  There is no guarding or rebound. Musculoskeletal:         General: No tenderness or deformity. Normal range of motion. Cervical back: Neck supple. Skin:     General: Skin is warm and dry. Neurological:      Mental Status: She is alert. Cranial Nerves: No cranial nerve deficit. Comments: 5/5 strength in all 4 extremities. Normal gait. DIAGNOSTIC RESULTS     EKG:All EKG's are interpreted by the Emergency Department Physician who either signs or Co-signs this chart in the absence of a cardiologist.    The Ekg interpreted by me shows  normal sinus rhythm with a rate of 92  Axis is   Normal  QTc is  440ms  Intervals and Durations are unremarkable. ST Segments: normal  No previous EKG available for comparison      RADIOLOGY:     Interpretation per the Radiologist below, if available at the time of this note:    XR CHEST PORTABLE   Final Result   No airspace disease by radiograph. Mediastinal contours are suboptimally evaluated due to rotated projection. If there is concern for an acute mediastinal process, CT angio chest would   better evaluate. LABS:  Labs Reviewed   CBC WITH AUTO DIFFERENTIAL - Abnormal; Notable for the following components:       Result Value    WBC 12.1 (*)     Neutrophils Absolute 10.2 (*)     All other components within normal limits   BASIC METABOLIC PANEL - Abnormal; Notable for the following components:    Sodium 134 (*)     Potassium 5.4 (*)     Chloride 98 (*)     Glucose 113 (*)     All other components within normal limits   COVID-19, RAPID   RAPID INFLUENZA A/B ANTIGENS   TROPONIN       All otherlabs were within normal range or not returned as of this dictation.     EMERGENCY DEPARTMENT COURSE and DIFFERENTIAL DIAGNOSIS/MDM:   Vitals:    Vitals:    04/24/22 2051   BP: (!) 153/74   Pulse: 98   Resp: 16   Temp: 98.3 °F (36.8 °C)   TempSrc: Oral   SpO2: 98%   Weight: 147 lb (66.7 kg)   Height: 5' 2\" (1.575 m)         MDM   HEART SCORE:    History: +2 for high suspicion  EKG: +0 for normal EKG   Age: +4 for age >65 years  Risk factors (includes HLD, HTN, DM, tobacco use, obesity, and +FHx): +0 for no risk factors  Initial troponin: +0 for negative troponin    Heart score: 4. This falls under the following category: Score of 4-6, which indicates low/moderate risk for major adverse cardiac event and supports observation with repeated troponins and/or non-invasive testing      Initial EKG and troponin are unremarkable however given patient's symptoms including diaphoresis and radiation of pain in addition to her age her heart score is 4 and I feel she is appropriate for admission for further rule out. The patient is given 162 mg of additional aspirin to complete her dose as well as Tylenol and Zofran. Rapid COVID and flu tests are obtained. Patient strongly prefers to be admitted to [facility] due to patient personal preference. She is transferred to [facility] for further evaluation and care. Patient expresses understanding and agreement with this plan. CONSULTS:  IP CONSULT TO HOSPITALIST         Procedures    FINAL IMPRESSION      1. Chest pain, unspecified type          DISPOSITION/PLAN   DISPOSITION Decision To Admit 04/24/2022 10:12:11 PM           (Please note that portions of this note were completed with a voice recognition program.  Efforts were made to edit the dictations but occasionally words aremis-transcribed. )    Shelbi Reynoso MD (electronically signed)  Attending Emergency Physician           Shelbi Reynoso MD  04/24/22 2552

## 2022-04-25 NOTE — CARE COORDINATION
CASE MANAGEMENT INITIAL ASSESSMENT    Reviewed chart and completed assessment with patient at bedside  Family present:  no  Explained Case Management role/services. yes    Health Care Decision Maker :   Primary Decision Maker: Myesha Jacobs Child - [phone]    Primary Decision Maker: Alysia Snowr - Child - [phone]    Primary Decision Maker: Doroteo Cheung Child - [phone]    Secondary Decision Maker: Esa Cea - [phone]        Admit date/status: OVA 4/25/22 @ 1015  Diagnosis: chest pain     Is this a Readmission?:  No      Insurance: Medicare     Precert required for SNF: No       3 night stay required: No    Living arrangements, Adls, care needs, prior to admission: resides alone, IPTA, active     Durable Medical Equipment at home:  None    Services in the home and/or outpatient, prior to admission:none     Current PCP: Geovani Romo                 Medications: Prescription coverage? Yes Will pt require financial assistance with medications No     Transportation needs:  Pt states family can provide     PT/OT recs: [facility] Exemption Notification (HEN): na    Barriers to discharge: none    Plan/comments: pt intends dc to home without needs. Please consult CM team if needs arise.      Sharona Cisneros RN

## 2022-04-25 NOTE — ED NOTES
[facility] transfer canceled d/t change of time from 0730 to 0900. [facility] on way to facility for transfer to [facility].       Maycol Martinez RN  04/25/22 9452

## 2022-04-25 NOTE — ED NOTES
Called the access center at [phone] to have the patient transferred to [facility]. Bobbi Slater called back at [phone] and spoke with Dr Joseline Ball and accepted the patient. Waiting on bed assignment.       Rendall Canavan  68/49/36 4911

## 2022-04-25 NOTE — H&P
HOSPITAL MEDICINE     History & Physical        Patient:  Devera Gilford  YOB: 1945    MRN: [id number]     Acct: [de-identified]    PCP: Dayan Hernandez MD    Date of Admission: 4/25/2022    Date of Service:   Pt seen/examined on 4/25/2022     Placed in Observation. History obtained from reviewing the medical record and patient/family Interview. Assessment:     Devera Gilford is a 78 y.o. female who presented/brought to  on 4/25/2022  For      1. Atypical chest pain  2. Probable acute viral syndrome (sore throat, nausea, vomiting, leukocytosis) no abdominal pain, diarrhea or constipation. >  3. Mild hyperkalemia, likely secondary to dehydration poor oral intake in the setting of lisinopril use     comorbidities:    · Essential hypertension, will hold lisinopril today  · Dyslipidemia, continue statin  · Mood disorder, continue Celexa    Plan:  1. Follow troponin trend, telemetry, exercise stress test in the a.m.  2. IV hydration, supportive care repeat potassium level in the a.m.  3. .  Continue aspirin, statin  4. Check lipase, D-dimer  5. Repeat CXR  6. Continue selected home medications as ordered          Code Status: Full Code        DVT prophylaxis: [x] Lovenox                                 [] SCDs                                 [] SQ Heparin                                 [] Encourage ambulation           [] Already on Anticoagulation     Disposition:    [x] Home       [] TCU       [] Rehab       [] Psych       [] SNF       [] [place]       [] Other-    -------------------------------------------------------------  Chief Complaint: Chest discomfort      History Of Present Illness:       78 y.o. female who presented to ED with chest discomfort started yesterday, symptoms are moderate intermittent. Patient reported that she experienced neck pressure approximately a week ago, she took 2 aspirin and symptoms resolved.       Yesterday she was at a  [facility] birthday party  when she is experienced chest pressure while sitting, radiated to the left arm, no associated shortness of breath. Patient report multiple episode of none bloody emesis, and sweating, no fever. She reports sore throat which she attributed to her vomiting. Patient endorses nonproductive cough, no shortness of breath, hemoptysis or wheezing. Patient stated that one of the grandchildren was sick with a cold. No suspicious food. Patient decided to go to [facility] ED for further evaluation, upon arrival EKG obtained and showed normal sinus rhythm      Of note patient medical history significant for essential hypertension, dyslipidemia, she had negative stress test in 2018. Patient denied tobacco use. Patient stated that she is previously well without any complaint, no weight loss or other medical conditions prior to her presentation. Past Medical History:          Diagnosis Date    GERD (gastroesophageal reflux disease)        Past Surgical History:          Procedure Laterality Date    HYSTERECTOMY         Medications Prior to Admission:      Prior to Admission medications    Medication Sig Start Date End Date Taking? Authorizing Provider   lisinopril (PRINIVIL;ZESTRIL) 10 MG tablet  3/24/22   Historical Provider, MD   atorvastatin (LIPITOR) 20 MG tablet Take by mouth 11/13/19   Historical Provider, MD   Magnesium 400 MG TABS Take by mouth    Historical Provider, MD   Omega-3 Fatty Acids (FISH OIL) 1200 MG CAPS Take by mouth    Historical Provider, MD   fexofenadine (ALLEGRA) 180 MG tablet Take 180 mg by mouth daily. Historical Provider, MD   citalopram (CELEXA) 20 MG tablet Take 20 mg by mouth daily. Historical Provider, MD   Multiple Vitamins-Minerals (THERAPEUTIC MULTIVITAMIN-MINERALS) tablet Take 1 tablet by mouth daily. Historical Provider, MD   Calcium Carbonate-Vitamin D (CALCIUM-VITAMIN D) 500-200 MG-UNIT per tablet Take 1 tablet by mouth daily.     Historical Provider MD       Allergies:  Sulfa antibiotics    Social History:      The patient currently lives by herself, independent    TOBACCO:   reports that she has never smoked. She has never used smokeless tobacco.  ETOH:   reports no history of alcohol use. Family History:       Reviewed in detail . Positive as follows:       No family history on file. Diet:  ADULT DIET; Regular; caffinated product  Diet NPO    REVIEW OF SYSTEMS:   Pertinent positives as noted in the HPI. All other systems reviewed and negative. PHYSICAL EXAM:    /61   Pulse 97   Temp 98.1 °F (36.7 °C) (Oral)   Resp 18   Wt 146 lb 6.4 oz (66.4 kg)   SpO2 93%   BMI 26.78 kg/m²          Vitals:    04/25/22 1028   BP: 122/61   Pulse: 97   Resp: 18   Temp: 98.1 °F (36.7 °C)   TempSrc: Oral   SpO2: 93%   Weight: 146 lb 6.4 oz (66.4 kg)       General appearance:  No apparent distress, appears stated age and cooperative. HEENT:  Normal cephalic, atraumatic without obvious deformity. Pupils equal, round, and reactive to light. Extra ocular muscles intact. Conjunctivae/corneas clear. Neck: Supple, with full range of motion. No jugular venous distention. Trachea midline. Respiratory:  Normal respiratory effort. Clear to auscultation, bilaterally without Rales/Wheezes/Rhonchi. Cardiovascular:  Regular rate and rhythm with normal S1/S2 without murmurs, rubs or gallops. Abdomen: Soft, non-tender, non-distended with normal bowel sounds. Musculoskeletal:  No clubbing, cyanosis or edema bilaterally. Full range of motion without deformity. Skin: Skin color, texture, turgor normal.  No rashes or lesions. Neurologic:  Neurovascularly intact without any focal sensory/motor deficits.  Cranial nerves: II-XII intact, grossly non-focal.  Psychiatric:  Alert and oriented, thought content appropriate, normal insight  Capillary Refill: Brisk,< 3 seconds   Peripheral Pulses: +2 palpable, equal bilaterally       Labs:     Recent Labs     04/24/22  2030   WBC 12.1*   HGB 13.1   HCT 39.2        Recent Labs     04/24/22 2030   *   K 5.4*   CL 98*   CO2 25   BUN 17   CREATININE 0.6   CALCIUM 10.1     No results for input(s): AST, ALT, BILIDIR, BILITOT, ALKPHOS in the last 72 hours. No results for input(s): INR in the last 72 hours. Recent Labs     04/24/22 2030   TROPONINI <0.01       Urinalysis:    No results found for: [place], BACTERIA, RBCUA, BLOODU, [address], [place] [address]    Radiology:     CXR: I have reviewed the CXR with the following interpretation: No acute infiltrate, malrotation  EKG:  I have reviewed the EKG with the following interpretation: sinus no acute ischemic changes      NM Cardiac Stress Test Nuclear Imaging    (Results Pending)            Thank you Hugo Duran MD for the opportunity to be involved in this patient's care.     Electronically signed by Radha Patterson MD on 4/25/2022 at 11:30 AM

## 2022-04-25 NOTE — ED TRIAGE NOTES
Pt has generalized c/o chest pain and back pain between her shoulders intermittently x 1 week. N/V today and increased pain to posterior neck, with a headache as well.

## 2022-04-25 NOTE — ED NOTES
[facility] to [facility] regarding scheduled  time of 0730. Per [facility] dispatch, crew was there and clocked in and he would send them out, arrival time approximately 0900.       Mikayla West RN  04/25/22 3443

## 2022-04-25 NOTE — ED NOTES
Pt n/v relieved at this time, comfortable and updated regarding bed situation for admission. Provided with warm blanket and repositioned as tolerated per request. Denies further needs at this time, sister remains at bedside. Call light within reach, will continue to monitor. Marisela Lagunas RN.        You Leblanc RN  04/24/22 4500

## 2022-04-25 NOTE — ED NOTES
Bedside report given to Christie Farooq [place]. POC reviewed and care transferred at this time.       Oscar Amaro RN  04/25/22 4963

## 2022-04-26 ENCOUNTER — APPOINTMENT (OUTPATIENT)
Dept: NUCLEAR MEDICINE | Age: 77
DRG: 314 | End: 2022-04-26
Attending: INTERNAL MEDICINE
Payer: MEDICARE

## 2022-04-26 LAB
ALBUMIN SERPL-MCNC: 3.7 G/DL (ref 3.4–5)
ANION GAP SERPL CALCULATED.3IONS-SCNC: 8 MMOL/L (ref 3–16)
BUN BLDV-MCNC: 12 MG/DL (ref 7–20)
CALCIUM SERPL-MCNC: 9.6 MG/DL (ref 8.3–10.6)
CHLORIDE BLD-SCNC: 101 MMOL/L (ref 99–110)
CHOLESTEROL, TOTAL: 96 MG/DL (ref 0–199)
CO2: 25 MMOL/L (ref 21–32)
CREAT SERPL-MCNC: <0.5 MG/DL (ref 0.6–1.2)
GFR AFRICAN AMERICAN: >60
GFR NON-AFRICAN AMERICAN: >60
GLUCOSE BLD-MCNC: 97 MG/DL (ref 70–99)
HCT VFR BLD CALC: 31.9 % (ref 36–48)
HDLC SERPL-MCNC: 41 MG/DL (ref 40–60)
HEMOGLOBIN: 10.6 G/DL (ref 12–16)
LDL CHOLESTEROL CALCULATED: 42 MG/DL
LV EF: 65 %
LVEF MODALITY: NORMAL
MCH RBC QN AUTO: 28.4 PG (ref 26–34)
MCHC RBC AUTO-ENTMCNC: 33.1 G/DL (ref 31–36)
MCV RBC AUTO: 85.8 FL (ref 80–100)
PDW BLD-RTO: 13.3 % (ref 12.4–15.4)
PHOSPHORUS: 2.3 MG/DL (ref 2.5–4.9)
PLATELET # BLD: 259 K/UL (ref 135–450)
PMV BLD AUTO: 8.3 FL (ref 5–10.5)
POTASSIUM SERPL-SCNC: 4.4 MMOL/L (ref 3.5–5.1)
PRO-BNP: 285 PG/ML (ref 0–449)
PROCALCITONIN: 0.12 NG/ML (ref 0–0.15)
RBC # BLD: 3.72 M/UL (ref 4–5.2)
SODIUM BLD-SCNC: 134 MMOL/L (ref 136–145)
TRIGL SERPL-MCNC: 64 MG/DL (ref 0–150)
VLDLC SERPL CALC-MCNC: 13 MG/DL
WBC # BLD: 6.3 K/UL (ref 4–11)

## 2022-04-26 PROCEDURE — 6370000000 HC RX 637 (ALT 250 FOR IP): Performed by: NURSE PRACTITIONER

## 2022-04-26 PROCEDURE — G0378 HOSPITAL OBSERVATION PER HR: HCPCS

## 2022-04-26 PROCEDURE — 78452 HT MUSCLE IMAGE SPECT MULT: CPT

## 2022-04-26 PROCEDURE — 3430000000 HC RX DIAGNOSTIC RADIOPHARMACEUTICAL: Performed by: HOSPITALIST

## 2022-04-26 PROCEDURE — 93017 CV STRESS TEST TRACING ONLY: CPT

## 2022-04-26 PROCEDURE — 80069 RENAL FUNCTION PANEL: CPT

## 2022-04-26 PROCEDURE — 2580000003 HC RX 258: Performed by: HOSPITALIST

## 2022-04-26 PROCEDURE — 84145 PROCALCITONIN (PCT): CPT

## 2022-04-26 PROCEDURE — A9502 TC99M TETROFOSMIN: HCPCS | Performed by: HOSPITALIST

## 2022-04-26 PROCEDURE — 83880 ASSAY OF NATRIURETIC PEPTIDE: CPT

## 2022-04-26 PROCEDURE — 6370000000 HC RX 637 (ALT 250 FOR IP): Performed by: HOSPITALIST

## 2022-04-26 PROCEDURE — 6360000002 HC RX W HCPCS: Performed by: HOSPITALIST

## 2022-04-26 PROCEDURE — 36415 COLL VENOUS BLD VENIPUNCTURE: CPT

## 2022-04-26 PROCEDURE — 96366 THER/PROPH/DIAG IV INF ADDON: CPT

## 2022-04-26 PROCEDURE — 80061 LIPID PANEL: CPT

## 2022-04-26 PROCEDURE — 85027 COMPLETE CBC AUTOMATED: CPT

## 2022-04-26 RX ORDER — LANOLIN ALCOHOL/MO/W.PET/CERES
6 CREAM (GRAM) TOPICAL NIGHTLY
Status: DISCONTINUED | OUTPATIENT
Start: 2022-04-26 | End: 2022-04-27 | Stop reason: HOSPADM

## 2022-04-26 RX ORDER — FAMOTIDINE 20 MG/1
20 TABLET, FILM COATED ORAL DAILY
Status: DISCONTINUED | OUTPATIENT
Start: 2022-04-26 | End: 2022-04-27 | Stop reason: HOSPADM

## 2022-04-26 RX ADMIN — FAMOTIDINE 20 MG: 20 TABLET, FILM COATED ORAL at 15:27

## 2022-04-26 RX ADMIN — ASPIRIN 81 MG 81 MG: 81 TABLET ORAL at 08:25

## 2022-04-26 RX ADMIN — SODIUM CHLORIDE: 9 INJECTION, SOLUTION INTRAVENOUS at 03:56

## 2022-04-26 RX ADMIN — TETROFOSMIN 30.8 MILLICURIE: 1.38 INJECTION, POWDER, LYOPHILIZED, FOR SOLUTION INTRAVENOUS at 13:59

## 2022-04-26 RX ADMIN — TETROFOSMIN 10 MILLICURIE: 1.38 INJECTION, POWDER, LYOPHILIZED, FOR SOLUTION INTRAVENOUS at 12:02

## 2022-04-26 RX ADMIN — Medication 6 MG: at 23:46

## 2022-04-26 RX ADMIN — ACETAMINOPHEN 650 MG: 325 TABLET ORAL at 15:27

## 2022-04-26 RX ADMIN — CITALOPRAM HYDROBROMIDE 20 MG: 20 TABLET ORAL at 20:11

## 2022-04-26 RX ADMIN — SODIUM CHLORIDE, PRESERVATIVE FREE 10 ML: 5 INJECTION INTRAVENOUS at 08:25

## 2022-04-26 RX ADMIN — ATORVASTATIN CALCIUM 40 MG: 40 TABLET, FILM COATED ORAL at 20:11

## 2022-04-26 RX ADMIN — LEVOFLOXACIN 750 MG: 5 INJECTION, SOLUTION INTRAVENOUS at 19:01

## 2022-04-26 ASSESSMENT — PAIN SCALES - GENERAL
PAINLEVEL_OUTOF10: 0
PAINLEVEL_OUTOF10: 3
PAINLEVEL_OUTOF10: 0
PAINLEVEL_OUTOF10: 0

## 2022-04-26 ASSESSMENT — PAIN DESCRIPTION - DESCRIPTORS: DESCRIPTORS: ACHING

## 2022-04-26 ASSESSMENT — PAIN DESCRIPTION - LOCATION: LOCATION: HEAD

## 2022-04-26 NOTE — PROGRESS NOTES
Hospitalist Progress Note      PCP: Eris Rose MD    Date of Admission: 4/25/2022    Chief Complaint: Chest Pain    Hospital Course:    78 y.o. female who presented to ED with chest discomfort started yesterday, symptoms are moderate intermittent.     Patient reported that she experienced neck pressure approximately a week ago, she took 2 aspirin and symptoms resolved.    Yesterday, she was at a Grandchild birthday party when she is experienced chest pressure while sitting, radiated to the left arm, no associated shortness of breath. Patient report multiple episode of none bloody emesis, and sweating, no fever.       She reports sore throat which she attributed to her vomiting. Patient endorses nonproductive cough, no shortness of breath, hemoptysis or wheezing. Patient stated that one of the grandchildren was sick with a cold. No suspicious food.     Patient decided to go to [facility] ED for further evaluation, upon arrival EKG obtained and showed normal sinus rhythm    Subjective: No further n/v, NAD, VSS. Afebrile.       Medications:  Reviewed    Infusion Medications    sodium chloride      sodium chloride 75 mL/hr at 04/26/22 0356     Scheduled Medications    sodium chloride flush  5-40 mL IntraVENous 2 times per day    aspirin  81 mg Oral Daily    atorvastatin  40 mg Oral Nightly    enoxaparin  40 mg SubCUTAneous Q24H    citalopram  20 mg Oral Nightly    levofloxacin  750 mg IntraVENous Q24H     PRN Meds: sodium chloride flush, sodium chloride, ondansetron **OR** ondansetron, acetaminophen **OR** acetaminophen, polyethylene glycol      Intake/Output Summary (Last 24 hours) at 4/26/2022 1416  Last data filed at 4/26/2022 3805  Gross per 24 hour   Intake 240 ml   Output 1150 ml   Net -910 ml       Physical Exam Performed:    /66   Pulse 88   Temp 98.9 °F (37.2 °C) (Oral)   Resp 18   Ht 5' 2\" (1.575 m)   Wt 148 lb (67.1 kg)   SpO2 93%   BMI 27.07 kg/m²     General appearance: No apparent distress, appears stated age and cooperative. HEENT: Pupils equal, round, and reactive to light. Conjunctivae/corneas clear. Neck: Supple, with full range of motion. No jugular venous distention. Trachea midline. Respiratory:  Normal respiratory effort. Clear to auscultation, bilaterally without Rales/Wheezes/Rhonchi. Cardiovascular: Regular rate and rhythm with normal S1/S2 without murmurs, rubs or gallops. Abdomen: Soft, non-tender, non-distended with normal bowel sounds. Musculoskeletal: No clubbing, cyanosis or edema bilaterally. Full range of motion without deformity. Skin: Skin color, texture, turgor normal.  No rashes or lesions. Neurologic:  Neurovascularly intact without any focal sensory/motor deficits. Cranial nerves: II-XII intact, grossly non-focal.  Psychiatric: Alert and oriented, thought content appropriate, normal insight  Capillary Refill: Brisk,3 seconds, normal   Peripheral Pulses: +2 palpable, equal bilaterally       Labs:   Recent Labs     04/24/22 2030 04/26/22  0647   WBC 12.1* 6.3   HGB 13.1 10.6*   HCT 39.2 31.9*    259     Recent Labs     04/24/22 2030 04/26/22  0647   * 134*   K 5.4* 4.4   CL 98* 101   CO2 25 25   BUN 17 12   CREATININE 0.6 <0.5*   CALCIUM 10.1 9.6   PHOS  --  2.3*     Recent Labs     04/25/22  1342   AST 23   ALT 18   BILIDIR <0.2   BILITOT 0.4   ALKPHOS 76     No results for input(s): INR in the last 72 hours. Recent Labs     04/24/22 2030 04/25/22  1113 04/25/22  1342   TROPONINI <0.01 <0.01 <0.01       Urinalysis:    No results found for:  Ink, BACTERIA, RBCUA, BLOODU, SPECGRAV, GLUCOSEU    Radiology:  CT CHEST PULMONARY EMBOLISM W CONTRAST   Final Result   No evidence of pulmonary embolism or acute thoracic aortic abnormality.       Scattered faint airspace disease within the upper lobes in a tree-in-bud   configuration which is a nonspecific appearance although possibly acute and   may represent an active infectious process in the appropriate setting. Lungs   otherwise clear. Normal heart size. No pleural or pericardial effusion. XR CHEST (2 VW)   Final Result   No acute process. NM Cardiac Stress Test Nuclear Imaging    (Results Pending)           Assessment/Plan:    Active Hospital Problems    Diagnosis     Chest pain [R07.9]      Priority: Medium     Acute chest pain  -Trop neg x3,   -DD elevated, CTPA no PE, scattered faint airspace disease within upper lobes  -Levaquin initiated concern for CAP  -PCT . 012, WBC 12.1->6.3  -CXR stable  -Covid negative, Flu negative  -ECG-NSR  -ASA/Statin  -Echo ordered    GERD-pepcid    HLD-stain, lipid panel ordered    DVT Prophylaxis: Lovenox  Diet: Diet NPO  Code Status: Full Code    PT/OT Eval Status: ambulatory    Dispo - pending echo/stress    MERCEDEZ Pinto - CNP

## 2022-04-27 VITALS
RESPIRATION RATE: 18 BRPM | TEMPERATURE: 98.3 F | WEIGHT: 146.5 LBS | BODY MASS INDEX: 26.96 KG/M2 | OXYGEN SATURATION: 96 % | HEIGHT: 62 IN | HEART RATE: 70 BPM | SYSTOLIC BLOOD PRESSURE: 134 MMHG | DIASTOLIC BLOOD PRESSURE: 73 MMHG

## 2022-04-27 LAB
ANION GAP SERPL CALCULATED.3IONS-SCNC: 11 MMOL/L (ref 3–16)
BUN BLDV-MCNC: 8 MG/DL (ref 7–20)
C-REACTIVE PROTEIN: 44.1 MG/L (ref 0–5.1)
CALCIUM SERPL-MCNC: 9.9 MG/DL (ref 8.3–10.6)
CHLORIDE BLD-SCNC: 100 MMOL/L (ref 99–110)
CO2: 25 MMOL/L (ref 21–32)
CREAT SERPL-MCNC: <0.5 MG/DL (ref 0.6–1.2)
GFR AFRICAN AMERICAN: >60
GFR NON-AFRICAN AMERICAN: >60
GLUCOSE BLD-MCNC: 99 MG/DL (ref 70–99)
HCT VFR BLD CALC: 32.2 % (ref 36–48)
HEMOGLOBIN: 10.6 G/DL (ref 12–16)
LV EF: 55 %
LVEF MODALITY: NORMAL
MCH RBC QN AUTO: 28 PG (ref 26–34)
MCHC RBC AUTO-ENTMCNC: 32.8 G/DL (ref 31–36)
MCV RBC AUTO: 85.4 FL (ref 80–100)
PDW BLD-RTO: 13.5 % (ref 12.4–15.4)
PLATELET # BLD: 280 K/UL (ref 135–450)
PMV BLD AUTO: 8 FL (ref 5–10.5)
POTASSIUM REFLEX MAGNESIUM: 3.7 MMOL/L (ref 3.5–5.1)
RBC # BLD: 3.77 M/UL (ref 4–5.2)
SEDIMENTATION RATE, ERYTHROCYTE: 17 MM/HR (ref 0–30)
SODIUM BLD-SCNC: 136 MMOL/L (ref 136–145)
WBC # BLD: 5.2 K/UL (ref 4–11)

## 2022-04-27 PROCEDURE — G0378 HOSPITAL OBSERVATION PER HR: HCPCS

## 2022-04-27 PROCEDURE — 2580000003 HC RX 258: Performed by: HOSPITALIST

## 2022-04-27 PROCEDURE — 36415 COLL VENOUS BLD VENIPUNCTURE: CPT

## 2022-04-27 PROCEDURE — 85027 COMPLETE CBC AUTOMATED: CPT

## 2022-04-27 PROCEDURE — 6360000002 HC RX W HCPCS: Performed by: HOSPITALIST

## 2022-04-27 PROCEDURE — 85652 RBC SED RATE AUTOMATED: CPT

## 2022-04-27 PROCEDURE — 86140 C-REACTIVE PROTEIN: CPT

## 2022-04-27 PROCEDURE — 1200000000 HC SEMI PRIVATE

## 2022-04-27 PROCEDURE — 6370000000 HC RX 637 (ALT 250 FOR IP): Performed by: NURSE PRACTITIONER

## 2022-04-27 PROCEDURE — 99221 1ST HOSP IP/OBS SF/LOW 40: CPT | Performed by: INTERNAL MEDICINE

## 2022-04-27 PROCEDURE — 80048 BASIC METABOLIC PNL TOTAL CA: CPT

## 2022-04-27 PROCEDURE — 93306 TTE W/DOPPLER COMPLETE: CPT

## 2022-04-27 PROCEDURE — 6370000000 HC RX 637 (ALT 250 FOR IP): Performed by: HOSPITALIST

## 2022-04-27 RX ORDER — LEVOFLOXACIN 750 MG/1
750 TABLET ORAL DAILY
Qty: 3 TABLET | Refills: 0 | Status: SHIPPED | OUTPATIENT
Start: 2022-04-27 | End: 2022-04-30

## 2022-04-27 RX ORDER — NAPROXEN 500 MG/1
500 TABLET ORAL 2 TIMES DAILY WITH MEALS
Qty: 10 TABLET | Refills: 0 | Status: SHIPPED | OUTPATIENT
Start: 2022-04-27 | End: 2022-06-17 | Stop reason: ALTCHOICE

## 2022-04-27 RX ADMIN — ENOXAPARIN SODIUM 40 MG: 100 INJECTION SUBCUTANEOUS at 12:49

## 2022-04-27 RX ADMIN — ASPIRIN 81 MG 81 MG: 81 TABLET ORAL at 08:02

## 2022-04-27 RX ADMIN — SODIUM CHLORIDE, PRESERVATIVE FREE 10 ML: 5 INJECTION INTRAVENOUS at 08:02

## 2022-04-27 RX ADMIN — FAMOTIDINE 20 MG: 20 TABLET, FILM COATED ORAL at 08:02

## 2022-04-27 NOTE — DISCHARGE SUMMARY
Hospital Medicine Discharge Summary    Patient ID: Jessie Quintanilla      Patient's PCP: Lubna Prasad MD    Admit Date: 4/25/2022     Discharge Date: 4/27/2022      Admitting Provider: Pepito Peoples MD     Discharge Provider: MERCEDEZ Villarreal - CNP     Discharge Diagnoses: Active Hospital Problems    Diagnosis     Abnormal stress test [R94.39]      Priority: Medium    Chest pain [R07.9]      Priority: Medium       The patient was seen and examined on day of discharge and this discharge summary is in conjunction with any daily progress note from day of discharge. Hospital Course:     78 y. o. female who presented to ED with chest discomfort started yesterday, symptoms are moderate intermittent.     Patient reported that she experienced neck pressure approximately a week ago, she took 2 aspirin and symptoms resolved.    Yesterday, she was at a [employer] when she is experienced chest pressure while sitting, radiated to the left arm, no associated shortness of breath.  Patient report multiple episode of none bloody emesis, and sweating, no fever.       She reports sore throat which she attributed to her vomiting.  Patient endorses nonproductive cough, no shortness of breath, hemoptysis or wheezing.  Patient stated that one of the grandchildren was sick with a cold.  No suspicious food.     Patient decided to go to [facility] ED for further evaluation, upon arrival EKG obtained and showed normal sinus rhythm    Acute chest pain multifactorial, poss pna, pericarditis/viral infection  -Trop neg x3,   -DD elevated, CTPA no PE, scattered faint airspace disease within upper lobes  -Levaquin initiated concern for CAP  -PCT . 012, WBC 12.1->6.3  -CXR stable  -Covid negative, Flu negative  -ECG-NSR  -ASA/Statin  -ESR nl, CRP elevated  -Echo:Summary   Normal left ventricle size, wall thickness, and systolic function with an   estimated ejection fraction of 55%.    No regional wall motion abnormalities are seen. Grade I diastolic dysfunction with normal filling pressure. The right ventricle is normal in size and function. Aortic valve appears sclerotic but opens adequately. The aortic valve with max velocity of 2.08 m/sec, a maximum pressure   gradient of 17 mmHg and a mean pressure gradient of 11 mmHg. This is c/w   borderline mild aortic stenosis. Trace aortic and tricuspid valve regurgitation. Systolic pulmonary artery pressure (sPAP) is normal and estimated at 27 mmHg   (right atrial pressure 3 mmHg)   Pre-mature beats throughout the study. GERD-pepcid     HLD-stain, lipid panel wnl     Follow up with PCP/Cardiology. Return for any concerns. Trial Naproxen, Complete Levaquin. Physical Exam Performed:     /73   Pulse 70   Temp 98.3 °F (36.8 °C) (Oral)   Resp 18   Ht 5' 2\" (1.575 m)   Wt 146 lb 8 oz (66.5 kg)   SpO2 96%   BMI 26.80 kg/m²       General appearance:  No apparent distress, appears stated age and cooperative. HEENT:  Normal cephalic, atraumatic without obvious deformity. Pupils equal, round, and reactive to light. Extra ocular muscles intact. Conjunctivae/corneas clear. Neck: Supple, with full range of motion. No jugular venous distention. Trachea midline. Respiratory:  Normal respiratory effort. Clear to auscultation, bilaterally without Rales/Wheezes/Rhonchi. Cardiovascular:  Regular rate and rhythm with normal S1/S2 without murmurs, rubs or gallops. Abdomen: Soft, non-tender, non-distended with normal bowel sounds. Musculoskeletal:  No clubbing, cyanosis or edema bilaterally. Full range of motion without deformity. Skin: Skin color, texture, turgor normal.  No rashes or lesions. Neurologic:  Neurovascularly intact without any focal sensory/motor deficits.  Cranial nerves: II-XII intact, grossly non-focal.  Psychiatric:  Alert and oriented, thought content appropriate, normal insight  Capillary Refill: Brisk,< 3 seconds   Peripheral Pulses: +2 palpable, equal bilaterally       Labs: For convenience and continuity at follow-up the following most recent labs are provided:      CBC:    Lab Results   Component Value Date    WBC 5.2 04/27/2022    HGB 10.6 04/27/2022    HCT 32.2 04/27/2022     04/27/2022       Renal:    Lab Results   Component Value Date     04/27/2022    K 3.7 04/27/2022     04/27/2022    CO2 25 04/27/2022    BUN 8 04/27/2022    CREATININE <0.5 04/27/2022    CALCIUM 9.9 04/27/2022    PHOS 2.3 04/26/2022         Significant Diagnostic Studies    Radiology:   NM Cardiac Stress Test Nuclear Imaging   Final Result      CT CHEST PULMONARY EMBOLISM W CONTRAST   Final Result   No evidence of pulmonary embolism or acute thoracic aortic abnormality. Scattered faint airspace disease within the upper lobes in a tree-in-bud   configuration which is a nonspecific appearance although possibly acute and   may represent an active infectious process in the appropriate setting. Lungs   otherwise clear. Normal heart size. No pleural or pericardial effusion. XR CHEST (2 VW)   Final Result   No acute process.                 Consults:     IP CONSULT TO CARDIOLOGY    Disposition:  Home    Condition at Discharge: Stable    Discharge Instructions/Follow-up:  See AVS    Code Status:  Prior     Activity: activity as tolerated    Diet: cardiac diet      Discharge Medications:     Discharge Medication List as of 4/27/2022  2:52 PM           Details   levoFLOXacin (LEVAQUIN) 750 MG tablet Take 1 tablet by mouth daily for 3 days, Disp-3 tablet, R-0Normal      naproxen (NAPROSYN) 500 MG tablet Take 1 tablet by mouth 2 times daily (with meals) for 5 days, Disp-10 tablet, R-0Normal              Details   lisinopril (PRINIVIL;ZESTRIL) 10 MG tablet Historical Med      atorvastatin (LIPITOR) 20 MG tablet Take by mouthHistorical Med      Magnesium 400 MG TABS Take by mouthHistorical Med      Omega-3 Fatty Acids (FISH OIL) 1200 MG CAPS Take by mouthHistorical Med      fexofenadine (ALLEGRA) 180 MG tablet Take 180 mg by mouth daily. citalopram (CELEXA) 20 MG tablet Take 20 mg by mouth daily. Multiple Vitamins-Minerals (THERAPEUTIC MULTIVITAMIN-MINERALS) tablet Take 1 tablet by mouth daily. Calcium Carbonate-Vitamin D (CALCIUM-VITAMIN D) 500-200 MG-UNIT per tablet Take 1 tablet by mouth daily. Time Spent on discharge is more than 30 minutes in the examination, evaluation, counseling and review of medications and discharge plan. Signed:    MERCEDEZ Salazar - CNP   5/8/2022      Thank you Lis Carrasco MD for the opportunity to be involved in this patient's care. If you have any questions or concerns, please feel free to contact me at [phone].

## 2022-04-27 NOTE — CONSULTS
[facility]  [phone]      Attending Physician: Vernell Vaca MD  Reason for Consultation/Chief Complaint: CP    Subjective   History of Present Illness:  Jazmyn Rushing is a 78 y.o. patient who presented to the hospital with complaints of Cp and pain in left shoulder blade. Started 1 wk ago, had severe jaw pain and pain across chest, under breast and numbness in left fingers. Woke from sleep. Lasted 20min, got up and took 2 ASA an went back to bed. Next few days, felt tired. No further CP or jaw pain or pain across chest, did have some dull pain across left shoulder blade, and is constant. Still has shoulder pain now. No excertional CP. Sunday PM had lots of vomiting, + fever. To 100.3F here. Started coughing a lot, more since Sunday, states laying in bed flat bothered her pain,     Past Medical History:   has a past medical history of GERD (gastroesophageal reflux disease). Surgical History:   has a past surgical history that includes Hysterectomy. Social History:   reports that she has never smoked. She has never used smokeless tobacco. She reports that she does not drink alcohol and does not use drugs. Family History:  family history is not on file. Home Medications:  Were reviewed and are listed in nursing record and/or below  Prior to Admission medications    Medication Sig Start Date End Date Taking? Authorizing Provider   lisinopril (PRINIVIL;ZESTRIL) 10 MG tablet  3/24/22   Historical Provider, MD   atorvastatin (LIPITOR) 20 MG tablet Take by mouth 11/13/19   Historical Provider, MD   Magnesium 400 MG TABS Take by mouth    Historical Provider, MD   Omega-3 Fatty Acids (FISH OIL) 1200 MG CAPS Take by mouth    Historical Provider, MD   fexofenadine (ALLEGRA) 180 MG tablet Take 180 mg by mouth daily. Historical Provider, MD   citalopram (CELEXA) 20 MG tablet Take 20 mg by mouth daily.     Historical Provider, MD   Multiple Vitamins-Minerals (THERAPEUTIC MULTIVITAMIN-MINERALS) tablet Take 1 tablet by mouth daily. Historical Provider, MD   Calcium Carbonate-Vitamin D (CALCIUM-VITAMIN D) 500-200 MG-UNIT per tablet Take 1 tablet by mouth daily. Historical Provider, MD        CURRENT Medications:  perflutren lipid microspheres (DEFINITY) injection 1.65 mg, ONCE PRN  famotidine (PEPCID) tablet 20 mg, Daily  melatonin tablet 6 mg, Nightly  sodium chloride flush 0.9 % injection 5-40 mL, 2 times per day  sodium chloride flush 0.9 % injection 5-40 mL, PRN  0.9 % sodium chloride infusion, PRN  ondansetron (ZOFRAN-ODT) disintegrating tablet 4 mg, Q8H PRN   Or  ondansetron (ZOFRAN) injection 4 mg, Q6H PRN  acetaminophen (TYLENOL) tablet 650 mg, Q6H PRN   Or  acetaminophen (TYLENOL) suppository 650 mg, Q6H PRN  polyethylene glycol (GLYCOLAX) packet 17 g, Daily PRN  aspirin chewable tablet 81 mg, Daily  atorvastatin (LIPITOR) tablet 40 mg, Nightly  enoxaparin (LOVENOX) injection 40 mg, Q24H  citalopram (CELEXA) tablet 20 mg, Nightly  levoFLOXacin (LEVAQUIN) 750 MG/150ML infusion 750 mg, Q24H        Allergies:  Sulfa antibiotics     Review of Systems:   A 14 point review of symptoms completed. Pertinent positives identified in the HPI, all other review of symptoms negative as below.       Objective   PHYSICAL EXAM:    Vitals:    04/27/22 0745   BP: 124/62   Pulse: 68   Resp: 18   Temp: 98.1 °F (36.7 °C)   SpO2: 94%    Weight: 146 lb 8 oz (66.5 kg)         General Appearance:  Alert, cooperative, no distress, appears stated age   Head:  Normocephalic, without obvious abnormality, atraumatic   Eyes:  PERRL, conjunctiva/corneas clear   Nose: Nares normal, no drainage or sinus tenderness   Throat: Lips, mucosa, and tongue normal   Neck: Supple, symmetrical, trachea midline, no adenopathy, thyroid: not enlarged, symmetric, no tenderness/mass/nodules, no carotid bruit or JVD   Lungs:   Clear to auscultation bilaterally, respirations unlabored   Chest Wall:  No deformity or tenderness   Heart:  Regular rate and rhythm, S1, S2 normal, no murmur, rub or gallop   Abdomen:   Soft, non-tender, bowel sounds active all four quadrants,  no masses, no organomegaly   Extremities: Extremities normal, atraumatic, no cyanosis or edema   Pulses: 2+ and symmetric   Skin: Skin color, texture, turgor normal, no rashes or lesions   Pysch: Normal mood and affect   Neurologic: Normal gross motor and sensory exam.         Labs   CBC:   Lab Results   Component Value Date    WBC 5.2 2022    RBC 3.77 2022    HGB 10.6 2022    HCT 32.2 2022    MCV 85.4 2022    RDW 13.5 2022     2022     CMP:  Lab Results   Component Value Date     2022    K 4.4 2022     2022    CO2 25 2022    BUN 12 2022    CREATININE <0.5 2022    GFRAA >60 2022    AGRATIO 1.3 2018    LABGLOM >60 2022    GLUCOSE 97 2022    PROT 7.4 2022    CALCIUM 9.6 2022    BILITOT 0.4 2022    ALKPHOS 76 2022    AST 23 2022    ALT 18 2022     PT/INR:  No results found for: PTINR  HgBA1c:No results found for: LABA1C  Lab Results   Component Value Date    TROPONINI <0.01 2022         Cardiac Data     Last EK2022 NSR with diffuse NSST changes, no ischemia/injury, subtle LA changes and ST changes compared to 2018    Echo: 10/28/2014   Normal left ventricle size, wall thickness and systolic function with an   estimated ejection fraction of 55%. No regional wall motion abnormalities are seen. There is reversal of E/A inflow velocities across the mitral valve   suggesting type I diastolic dysfunction. Trace aortic and pulmonic regurgitation. Mild mitral and tricuspid regurgitation. Systolic pulmonary artery pressure (SPAP) is normal and estimated at 28mmHg   (RA pressure 3mmHg).     Stress Test: 2022     Mabel Contes is normal isotope uptake at stress and rest. There is no evidence of  myocardial ischemia or scar.    Normal LV systolic function and wall motion with calculated LVEF >65%.    Overall findings represent a low risk scan.        Abnormal exercise stress EKG as described below. Generally, an abnormal    stress EKG in the presence of normal myocardial perfusion on nuclear SPECT    imaging is associated with low cardiac risk. However, further clinical    correlation is recommended.        Hypertensive response to exercise. Cath:    Studies:   CTPA:  No evidence of pulmonary embolism or acute thoracic aortic abnormality.       Scattered faint airspace disease within the upper lobes in a tree-in-bud   configuration which is a nonspecific appearance although possibly acute and   may represent an active infectious process in the appropriate setting.  Lungs   otherwise clear.       Normal heart size.  No pleural or pericardial effusion. Assessment and Plan      1. Chest pain: Pt r/o for AMI  2. Abnormal Stress test      PLAN    1. Personally viewed stress test.  Nonspecific ST changes at baseline likely exacerbated with exercise and significant exercise hypertension. Also artifact with exercise. At most I would call this an equivocal study but normal perfusion is also reassuring. CP is fairly atypical and given ECG changes and Viral illness syndrome, ?small pericarditis as cause  2. Echo pending  3. Sed/CRP, ? pericarditis    Patient Active Problem List   Diagnosis    Abnormal EKG    Hyperlipemia    GILL (dyspnea on exertion)    Other chest pain    Chest pain           Thank you for allowing us to participate in the care of Bebeto Kapoor. Please call me with any questions [phone]. Marie Lindo MD, [address] [facility] Cardiologist  [facility], [place]  [phone] [facility]  [phone] [address] [place] [address]  4/27/2022 8:01 AM    I will address the patient's cardiac risk factors and adjusted pharmacologic treatment as needed.  In addition, I have reinforced the need for patient directed risk factor modification. All questions and concerns were addressed to the patient/family. Alternatives to my treatment were discussed. The note was completed using EMR. Every effort was made to ensure accuracy; however, inadvertent computerized transcription errors may be present.

## 2022-04-27 NOTE — PROGRESS NOTES
Pt A&Ox4. VSS. Shift assessment completed. Denies pain. ECHO pending. Call light within reach, bed in lowest position, wheels locked.

## 2022-04-27 NOTE — PROGRESS NOTES
Pt provided with discharge instructions. All questions answered. Prescriptions sent to inpatient pharmacy. Pt is in stable condition. Son will provide transportation.

## 2022-04-27 NOTE — CONSULTS
Consult placed    [phone] [facility] cardiology  Date:4/27/2022,  Time:7:54 AM        Electronically signed by Elif Dozier on 4/27/2022 at 7:54 AM

## 2022-05-06 NOTE — PROGRESS NOTES
Physician Progress Note      PATIENT:               Jesse Zavaleta  CSN #:                  [id number]  :                       1945  ADMIT DATE:       2022 10:15 AM  DISCH DATE:        2022 4:51 PM  RESPONDING  PROVIDER #:        Cyril WAKEFIELD          QUERY TEXT:    Pt admitted with chest pain and DCS is pending. Pt noted to have \"probable   acute viral syndrome\" per H&P, \"viral illness syndrome or ? small pericarditis   as cause\" per Cardiology consult and \"concern for CAP\" per attending PN . If possible, please document in progress notes and discharge summary if you   are evaluating and/or treating any of the following: The medical record reflects the following:  Risk Factors: HTN, HLD, GERD    Clinical Indicators: on arrival- WBC 12.1, CRP 44.1, CT chest- Scattered faint   airspace disease within the upper lobes in a tree-in-bud configuration which   is a nonspecific appearance although possibly acute and may represent an   active infectious process in the appropriate setting. per Cardiology c/s-\"Personally viewed stress test.  Nonspecific ST changes at   baseline likely exacerbated with exercise and significant exercise   hypertension. Also artifact with exercise. At most I would call this an   equivocal study but normal perfusion is also reassuring. CP is fairly atypical   and given ECG changes and Viral illness syndrome, ?small pericarditis as   cause\"  per Attending PN - \"-Levaquin initiated concern for CAP. Meredith Izaguirre GERD-pepcid  Echo -EF 55%, no RWMA, gr 1 DD  DCS - pending    Treatment: labs, Levaquin, imaging, stress test, Cardiology consult, Pepcid,   ongoing supportive care    thank you,  Luiza Balderas RN, BSN  [email or web address]  Options provided:  -- Chest pain due to multi-factorial; possible PNA, pericarditis and viral   infection  -- Chest pain due to possible pneumonia  -- Chest pain due to pericarditis  -- Chest pain due to viral infection  -- Chest pain d/t other pleas specify, please specify. -- Other - I will add my own diagnosis  -- Disagree - Not applicable / Not valid  -- Disagree - Clinically unable to determine / Unknown  -- Refer to Clinical Documentation Reviewer    PROVIDER RESPONSE TEXT:    This patient has chest pain due to multi factorial; possible PNA, pericarditis   and viral infection.     Query created by: Vijay Castellanos on 5/2/2022 7:31 AM      Electronically signed by:  Crystal Chan 5/6/2022 8:45 AM

## 2022-05-11 ENCOUNTER — TELEPHONE (OUTPATIENT)
Dept: CARDIOLOGY CLINIC | Age: 77
End: 2022-05-11

## 2022-06-16 NOTE — PROGRESS NOTES
[address] [facility]   Cardiovascular Evaluation    PATIENT: Whit Plaza  DATE: 2022  MRN: [id number]  CSN: [id number]  : 1945      Primary Care Doctor: Lis Carrasco MD  Reason for evaluation:   Follow-up and Hyperlipidemia      Subjective:   History of present illness on initial date of evaluation:   Whit Plaza is a 78 y.o. patient who presents for hospital follow up. She has a past medical history of HLD, CP. She was admitted 2022-2022 to the hospital with complaints of CP and pain in left shoulder blade. Started 1 wk ago, had severe jaw pain and pain across chest, under breast and numbness in left fingers. Woke from sleep. Lasted 20min, got up and took 2 ASA an went back to bed. Next few days, felt tired. No further CP or jaw pain or pain across chest, did have some dull pain across left shoulder blade, and is constant. Still has shoulder pain now. No excertional CP.  PM had lots of vomiting, + fever. To 100.3F here. Started coughing a lot, more since , states laying in bed flat bothered her pain. Today she states her chest pain has improved since being in the hospital in 2022. She has pneumonia prior tot he hospitalization and has since recovered. Patient is taking all cardiac medications as prescribed and tolerates them well. Patient denies current edema, chest pain, sob, palpitations, dizziness or syncope. Patient Active Problem List   Diagnosis    Abnormal EKG    Hyperlipemia    GILL (dyspnea on exertion)    Other chest pain    Chest pain    Abnormal stress test         Past Medical History:   has a past medical history of GERD (gastroesophageal reflux disease). Surgical History:   has a past surgical history that includes Hysterectomy. Social History:   reports that she has never smoked. She has never used smokeless tobacco. She reports that she does not drink alcohol and does not use drugs.      Family History:  No evidence for sudden cardiac death or premature CAD    Home Medications:  Reviewed and are listed in nursing record. and/or listed below  Current Outpatient Medications   Medication Sig Dispense Refill    lisinopril (PRINIVIL;ZESTRIL) 10 MG tablet       atorvastatin (LIPITOR) 20 MG tablet Take by mouth      Omega-3 Fatty Acids (FISH OIL) 1200 MG CAPS Take by mouth      fexofenadine (ALLEGRA) 180 MG tablet Take 180 mg by mouth daily.  citalopram (CELEXA) 20 MG tablet Take 20 mg by mouth daily.  Multiple Vitamins-Minerals (THERAPEUTIC MULTIVITAMIN-MINERALS) tablet Take 1 tablet by mouth daily.  Calcium Carbonate-Vitamin D (CALCIUM-VITAMIN D) 500-200 MG-UNIT per tablet Take 1 tablet by mouth daily.  naproxen (NAPROSYN) 500 MG tablet Take 1 tablet by mouth 2 times daily (with meals) for 5 days 10 tablet 0    Magnesium 400 MG TABS Take by mouth (Patient not taking: Reported on 6/17/2022)       No current facility-administered medications for this visit. Allergies:  Sulfa antibiotics     Review of Systems:   A 14 point review of symptoms completed. Pertinent positives identified in the HPI, all other review of symptoms negative as below.     Objective:   PHYSICAL EXAM:    Vitals:    06/17/22 0923   BP: 137/74   Pulse: 85   SpO2: 93%    Weight: 143 lb 12.8 oz (65.2 kg)     Wt Readings from Last 3 Encounters:   06/17/22 143 lb 12.8 oz (65.2 kg)   04/27/22 146 lb 8 oz (66.5 kg)   04/24/22 147 lb (66.7 kg)       Vitals:    06/17/22 0923   BP: 137/74   Site: Left Upper Arm   Position: Sitting   Pulse: 85   SpO2: 93%   Weight: 143 lb 12.8 oz (65.2 kg)   Height: 5' 2\" (1.575 m)             General Appearance:  Alert, cooperative, no distress, appears stated age   Head:  Normocephalic, atraumatic   Eyes:  PERRL, conjunctiva/corneas clear   Nose: Nares normal, no drainage or sinus tenderness   Throat: Lips, mucosa, and tongue normal   Neck: Supple, symmetrical, trachea midline, NL thyroid no carotid ventricle size, wall thickness, and systolic function with an   estimated ejection fraction of 55%. No regional wall motion abnormalities are seen. Grade I diastolic dysfunction with normal filling pressure. The right ventricle is normal in size and function. Aortic valve appears sclerotic but opens adequately. The aortic valve with max velocity of 2.08 m/sec, a maximum pressure   gradient of 17 mmHg and a mean pressure gradient of 11 mmHg. This is c/w   borderline mild aortic stenosis. Trace aortic and tricuspid valve regurgitation. Systolic pulmonary artery pressure (sPAP) is normal and estimated at 27 mmHg   (right atrial pressure 3 mmHg)   Pre-mature beats throughout the study. ECHO 10/28/2014:  Summary   Normal left ventricle size, wall thickness and systolic function with an   estimated ejection fraction of 55%. No regional wall motion abnormalities are seen. There is reversal of E/A inflow velocities across the mitral valve   suggesting type I diastolic dysfunction. Trace aortic and pulmonic regurgitation. Mild mitral and tricuspid regurgitation. Systolic pulmonary artery pressure (SPAP) is normal and estimated at 28mmHg   (RA pressure 3mmHg). STRESS TEST 4/26/2022:  Summary There is normal isotope uptake at stress and rest. There is no evidence of  myocardial ischemia or scar. Normal LV systolic function and wall motion with calculated LVEF >65%. Overall findings represent a low risk scan. Abnormal exercise stress EKG as described below. Generally, an abnormal  stress EKG in the presence of normal myocardial perfusion on nuclear SPECT  imaging is associated with low cardiac risk. However, further clinical  correlation is recommended. Hypertensive response to exercise. Stress Test 9/17/2018:  Normal LV size and systolic function. Left ventricular ejection fraction of  67%. Normal wall motion.  There is normal isotope uptake at stress and rest.  There is no evidence of myocardial ischemia or scar. Equivocal EKG response to stress. CARDIAC CATH:    VASCULAR/OTHER IMAGING:      Assessment and Plan   Evan Ribeiro is a 78 y.o. female who presents today for the following problems:      1. Chest pain: resolved   -Ruled out for myocardial infarction. Elevated CRP  2. Abnormal Stress test  3. Aortic sclerosis     PLAN   1. Stress test w Nonspecific ST changes at baseline likely exacerbated with exercise and significant exercise hypertension. Also artifact with exercise. At most I would call this an equivocal study but normal perfusion is also reassuring. CP is fairly atypical and given ECG changes and Viral illness syndrome  2.  Echocardiogram is normal.  3.  Elevated CRP. 4.  All symptoms have resolved. Patient is no longer on naproxen and will take it off her MAR    5.  Echocardiogram in 1 year for aortic sclerosis     Patient Active Problem List   Diagnosis    Abnormal EKG    Hyperlipemia    GILL (dyspnea on exertion)    Other chest pain    Chest pain    Abnormal stress test       Patient Plan:  1. Continue current medications  2. Echocardiogram in 1 year to monitor aortic valve  3. Follow up in 1 year      This note was scribed in the presence of Huma Avalos MD by Virginia Burnett RN.      Daniella Tomlinson MD, personally performed the services described in this documentation as scribed by the above signed scribe in my presence, and it is both accurate and complete to the best of our ability and knowledge. I agree with the details independently gathered by my clinical support staff, while the remaining scribed note accurately describes my personal service to the patient. The above RN is working as a scribe for and in the presence of myself . Working as a scribe, the RN may have prepopulated components of this note with my historical intellectual property under my direct supervision. Any additions to this intellectual property were performed at my direction.   Furthermore, the content and accuracy of this note have been reviewed by me to the best of my ability.

## 2022-06-17 ENCOUNTER — OFFICE VISIT (OUTPATIENT)
Dept: CARDIOLOGY CLINIC | Age: 77
End: 2022-06-17
Payer: MEDICARE

## 2022-06-17 VITALS
OXYGEN SATURATION: 93 % | WEIGHT: 143.8 LBS | HEIGHT: 62 IN | BODY MASS INDEX: 26.46 KG/M2 | DIASTOLIC BLOOD PRESSURE: 74 MMHG | HEART RATE: 85 BPM | SYSTOLIC BLOOD PRESSURE: 137 MMHG

## 2022-06-17 DIAGNOSIS — E78.2 MIXED HYPERLIPIDEMIA: ICD-10-CM

## 2022-06-17 DIAGNOSIS — R07.9 CHEST PAIN, UNSPECIFIED TYPE: Primary | ICD-10-CM

## 2022-06-17 DIAGNOSIS — R01.1 MURMUR, CARDIAC: ICD-10-CM

## 2022-06-17 DIAGNOSIS — I35.8 AORTIC VALVE SCLEROSIS: ICD-10-CM

## 2022-06-17 PROCEDURE — 99214 OFFICE O/P EST MOD 30 MIN: CPT | Performed by: INTERNAL MEDICINE

## 2022-06-17 PROCEDURE — 1036F TOBACCO NON-USER: CPT | Performed by: INTERNAL MEDICINE

## 2022-06-17 PROCEDURE — G8417 CALC BMI ABV UP PARAM F/U: HCPCS | Performed by: INTERNAL MEDICINE

## 2022-06-17 PROCEDURE — G8427 DOCREV CUR MEDS BY ELIG CLIN: HCPCS | Performed by: INTERNAL MEDICINE

## 2022-06-17 PROCEDURE — G8399 PT W/DXA RESULTS DOCUMENT: HCPCS | Performed by: INTERNAL MEDICINE

## 2022-06-17 PROCEDURE — 1090F PRES/ABSN URINE INCON ASSESS: CPT | Performed by: INTERNAL MEDICINE

## 2022-06-17 PROCEDURE — 3017F COLORECTAL CA SCREEN DOC REV: CPT | Performed by: INTERNAL MEDICINE

## 2022-06-17 PROCEDURE — 1123F ACP DISCUSS/DSCN MKR DOCD: CPT | Performed by: INTERNAL MEDICINE

## 2022-06-17 NOTE — PATIENT INSTRUCTIONS
Your provider has ordered testing for further evaluation. An order/prescription has been included in your paper work.  To schedule outpatient testing, contact Central Scheduling by calling [facility] ([phone]). Patient Plan:  1. Continue current medications  2. Echocardiogram in 1 year to monitor aortic valve  3.  Follow up in 1 year

## 2023-03-23 ENCOUNTER — HOSPITAL ENCOUNTER (OUTPATIENT)
Dept: MAMMOGRAPHY | Age: 78
Discharge: HOME | End: 2023-03-23
Payer: MEDICARE

## 2023-03-23 DIAGNOSIS — Z12.31 BREAST CANCER SCREENING BY MAMMOGRAM: ICD-10-CM

## 2023-03-23 PROCEDURE — 77063 BREAST TOMOSYNTHESIS BI: CPT

## 2023-12-05 ENCOUNTER — HOSPITAL ENCOUNTER (OUTPATIENT)
Dept: GENERAL RADIOLOGY | Age: 78
Discharge: HOME | End: 2023-12-05
Payer: MEDICARE

## 2023-12-05 DIAGNOSIS — Z78.0 MENOPAUSE: ICD-10-CM

## 2023-12-05 PROCEDURE — 77080 DXA BONE DENSITY AXIAL: CPT
